# Patient Record
Sex: MALE | Race: WHITE | URBAN - METROPOLITAN AREA
[De-identification: names, ages, dates, MRNs, and addresses within clinical notes are randomized per-mention and may not be internally consistent; named-entity substitution may affect disease eponyms.]

---

## 2022-09-13 ENCOUNTER — OFFICE VISIT (OUTPATIENT)
Dept: INTERNAL MEDICINE CLINIC | Age: 22
End: 2022-09-13
Payer: COMMERCIAL

## 2022-09-13 ENCOUNTER — HOSPITAL ENCOUNTER (OUTPATIENT)
Dept: LAB | Age: 22
Discharge: HOME | End: 2022-09-13
Payer: COMMERCIAL

## 2022-09-13 VITALS
WEIGHT: 183 LBS | HEART RATE: 66 BPM | TEMPERATURE: 97 F | BODY MASS INDEX: 24.79 KG/M2 | SYSTOLIC BLOOD PRESSURE: 108 MMHG | DIASTOLIC BLOOD PRESSURE: 60 MMHG | HEIGHT: 72 IN | OXYGEN SATURATION: 99 %

## 2022-09-13 DIAGNOSIS — L02.414 ABSCESS OF ARM, LEFT: ICD-10-CM

## 2022-09-13 DIAGNOSIS — Z11.59 ENCOUNTER FOR HEPATITIS C SCREENING TEST FOR LOW RISK PATIENT: ICD-10-CM

## 2022-09-13 DIAGNOSIS — Z13.228 SCREENING FOR METABOLIC DISORDER: ICD-10-CM

## 2022-09-13 DIAGNOSIS — Z13.1 DIABETES MELLITUS SCREENING: ICD-10-CM

## 2022-09-13 DIAGNOSIS — Z76.89 ESTABLISHING CARE WITH NEW DOCTOR, ENCOUNTER FOR: Primary | ICD-10-CM

## 2022-09-13 DIAGNOSIS — Z11.4 ENCOUNTER FOR SCREENING FOR HIV: ICD-10-CM

## 2022-09-13 DIAGNOSIS — Z13.0 SCREENING FOR DEFICIENCY ANEMIA: ICD-10-CM

## 2022-09-13 DIAGNOSIS — I45.6 WPW (WOLFF-PARKINSON-WHITE SYNDROME): ICD-10-CM

## 2022-09-13 LAB
ANION GAP SERPL CALC-SCNC: 9 MMOL/L (ref 3–18)
BASOPHILS # BLD: 0.1 K/UL (ref 0–0.1)
BASOPHILS NFR BLD: 1 % (ref 0–2)
BUN SERPL-MCNC: 15 MG/DL (ref 7–18)
BUN/CREAT SERPL: 15 (ref 12–20)
CALCIUM SERPL-MCNC: 8.7 MG/DL (ref 8.5–10.1)
CHLORIDE SERPL-SCNC: 105 MMOL/L (ref 100–111)
CO2 SERPL-SCNC: 28 MMOL/L (ref 21–32)
CREAT SERPL-MCNC: 1.01 MG/DL (ref 0.6–1.3)
DIFFERENTIAL METHOD BLD: ABNORMAL
EOSINOPHIL # BLD: 0.6 K/UL (ref 0–0.4)
EOSINOPHIL NFR BLD: 5 % (ref 0–5)
ERYTHROCYTE [DISTWIDTH] IN BLOOD BY AUTOMATED COUNT: 11.8 % (ref 11.6–14.5)
EST. AVERAGE GLUCOSE BLD GHB EST-MCNC: 120 MG/DL
GLUCOSE SERPL-MCNC: 71 MG/DL (ref 74–99)
HBA1C MFR BLD: 5.8 % (ref 4.2–5.6)
HCT VFR BLD AUTO: 40.6 % (ref 36–48)
HGB BLD-MCNC: 13 G/DL (ref 13–16)
IMM GRANULOCYTES # BLD AUTO: 0.2 K/UL (ref 0–0.04)
IMM GRANULOCYTES NFR BLD AUTO: 1 % (ref 0–0.5)
LYMPHOCYTES # BLD: 3.1 K/UL (ref 0.9–3.6)
LYMPHOCYTES NFR BLD: 26 % (ref 21–52)
MCH RBC QN AUTO: 29.2 PG (ref 24–34)
MCHC RBC AUTO-ENTMCNC: 32 G/DL (ref 31–37)
MCV RBC AUTO: 91.2 FL (ref 78–100)
MONOCYTES # BLD: 0.9 K/UL (ref 0.05–1.2)
MONOCYTES NFR BLD: 8 % (ref 3–10)
NEUTS SEG # BLD: 7.1 K/UL (ref 1.8–8)
NEUTS SEG NFR BLD: 60 % (ref 40–73)
NRBC # BLD: 0 K/UL (ref 0–0.01)
NRBC BLD-RTO: 0 PER 100 WBC
PLATELET # BLD AUTO: 425 K/UL (ref 135–420)
PMV BLD AUTO: 9.2 FL (ref 9.2–11.8)
POTASSIUM SERPL-SCNC: 4.7 MMOL/L (ref 3.5–5.5)
RBC # BLD AUTO: 4.45 M/UL (ref 4.35–5.65)
SODIUM SERPL-SCNC: 142 MMOL/L (ref 136–145)
WBC # BLD AUTO: 11.8 K/UL (ref 4.6–13.2)

## 2022-09-13 PROCEDURE — 99203 OFFICE O/P NEW LOW 30 MIN: CPT | Performed by: INTERNAL MEDICINE

## 2022-09-13 PROCEDURE — 86803 HEPATITIS C AB TEST: CPT

## 2022-09-13 PROCEDURE — 85025 COMPLETE CBC W/AUTO DIFF WBC: CPT

## 2022-09-13 PROCEDURE — 83036 HEMOGLOBIN GLYCOSYLATED A1C: CPT

## 2022-09-13 PROCEDURE — 80048 BASIC METABOLIC PNL TOTAL CA: CPT

## 2022-09-13 PROCEDURE — 87389 HIV-1 AG W/HIV-1&-2 AB AG IA: CPT

## 2022-09-13 PROCEDURE — 36415 COLL VENOUS BLD VENIPUNCTURE: CPT

## 2022-09-13 RX ORDER — AMOXICILLIN AND CLAVULANATE POTASSIUM 875; 125 MG/1; MG/1
1 TABLET, FILM COATED ORAL 2 TIMES DAILY
COMMUNITY
Start: 2022-09-06 | End: 2022-09-16

## 2022-09-13 NOTE — PROGRESS NOTES
Fabio Hartley is a 25 y.o. male (: 2000) presenting to address:    Chief Complaint   Patient presents with    Establish Care       Vitals:    22 1102   Temp: 97 °F (36.1 °C)   Weight: 183 lb (83 kg)   Height: 6' (1.829 m)   PainSc:   0 - No pain       Hearing/Vision:   No results found. Learning Assessment:   No flowsheet data found. Depression Screening:     3 most recent PHQ Screens 2022   Little interest or pleasure in doing things Not at all   Feeling down, depressed, irritable, or hopeless Not at all   Total Score PHQ 2 0   Trouble falling or staying asleep, or sleeping too much Not at all   Feeling tired or having little energy Not at all   Poor appetite, weight loss, or overeating Not at all   Feeling bad about yourself - or that you are a failure or have let yourself or your family down Not at all   Trouble concentrating on things such as school, work, reading, or watching TV Not at all   Moving or speaking so slowly that other people could have noticed; or the opposite being so fidgety that others notice Not at all   Thoughts of being better off dead, or hurting yourself in some way Not at all   PHQ 9 Score 0   How difficult have these problems made it for you to do your work, take care of your home and get along with others Not difficult at all     Fall Risk Assessment:   No flowsheet data found. Abuse Screening:   No flowsheet data found. ADL Assessment:   No flowsheet data found. Coordination of Care Questionaire:   1. \"Have you been to the ER, urgent care clinic since your last visit? Hospitalized since your last visit? \" Yes When: [facility] for abcess in arm  last tuesday    2. \"Have you seen or consulted any other health care providers outside of the [address] [place] [address] since your last visit? \" No     3. For patients aged 39-70: Has the patient had a colonoscopy / FIT/ Cologuard? NA - based on age      If the patient is female:    4.  For patients aged 41-77: Has the patient had a mammogram within the past 2 years? NA - based on age or sex  See top three    5. For patients aged 21-65: Has the patient had a pap smear? NA - based on age or sex    Advanced Directive:   1. Do you have an Advanced Directive? NO    2. Would you like information on Advanced Directives?  NO

## 2022-09-13 NOTE — PROGRESS NOTES
HISTORY OF PRESENT ILLNESS  Raina Halsted is a 25 y.o. male. Patient comes to establish PCP. About 10 days ago patient noticed a wound under his left upper arm interior aspect of the biceps. He went to the emergency to get checked. Abscess was drained. He was started on doxycycline for 5 days and switched to Augmentin to complete 10 days. Still on antibiotics. Wound has improved. No secretions no redness no fever chills. He has an appointment with general surgery in 2 days. He works at the [employer], he thinks that probably he scratched himself and contaminated water. She also requires a letter to resume regular activities and work. Workplace will need to make accommodations for him to keep his wound dry and clean at all times until it completely heals. Letter will be provided. Magdalena-Parkinson-White syndrome  Following up with Michael Andujar MD (02/2022). Occasional awareness of palpitations. No syncope, no dizziness, no lightheadedness, no vision changes, no loss of consciousness. Underwent treadmill stress testing 02/2021 for 14 minutes and preexcitation was suppressed at heart rates above 180 bpm.  He is planning to have EP testing and potential ablation. Referral sent by cardiologist.  [place] up with cardiologist annually. Left arm abscess  Abscess was drained in ED (09/06/2022). Received doxycycline 5 days and switched to Augmentin to complete 10 days. Wound is healing well. General surgery follow-up on 09/15/2022. PAST MEDICAL HISTORY  Surgery: Denied. Work: At [employer] during  Family: Mother healthy. Father diabetes type 2. Siblings healthy. Social: Tobacco denied, alcohol 2 drinks a week, marijuana once a week. Sexual: Single, 1 partner, female, denied STIs history, no children. Review of Systems   Constitutional:  Negative for chills and fever.    Cardiovascular:  Negative for chest pain, palpitations and leg swelling. Gastrointestinal:  Negative for abdominal pain, constipation, diarrhea, heartburn, nausea and vomiting. Visit Vitals  /60 (BP 1 Location: Left upper arm, BP Patient Position: Sitting, BP Cuff Size: Adult)   Pulse 66   Temp 97 °F (36.1 °C)   Ht 6' (1.829 m)   Wt 183 lb (83 kg)   SpO2 99%   BMI 24.82 kg/m²     Physical Exam  HENT:      Mouth/Throat:      Mouth: Mucous membranes are moist.      Pharynx: Oropharynx is clear. Eyes:      Extraocular Movements: Extraocular movements intact. Conjunctiva/sclera: Conjunctivae normal.      Pupils: Pupils are equal, round, and reactive to light. Cardiovascular:      Rate and Rhythm: Normal rate and regular rhythm. Pulses: Normal pulses. Heart sounds: Normal heart sounds. Pulmonary:      Effort: Pulmonary effort is normal.   Abdominal:      General: Abdomen is flat. Bowel sounds are normal.      Palpations: Abdomen is soft. Musculoskeletal:        Arms:       Cervical back: Normal range of motion and neck supple. Comments: Left arm abscess with drain present no discharge, no redness, no tenderness, no induration. Wound is clean and healing. Both radial pulses are symmetric. Neurological:      Mental Status: He is alert. ASSESSMENT and PLAN    ICD-10-CM ICD-9-CM    1. Establishing care with new doctor, encounter for  Z76.89 V65.8       2. Screening for deficiency anemia  Z13.0 V78.1 CBC WITH AUTOMATED DIFF      3. Diabetes mellitus screening  K85.5 M44.3 METABOLIC PANEL, BASIC      HEMOGLOBIN A1C WITH EAG      4. Screening for metabolic disorder  K92.017 D30.84 METABOLIC PANEL, BASIC      5. Encounter for screening for HIV  Z11.4 V73.89 HIV 1/2 AG/AB, 4TH GENERATION,W RFLX CONFIRM      6. Encounter for hepatitis C screening test for low risk patient  Z11.59 V73.89 HEPATITIS C AB        Follow-up and Dispositions    Return in about 3 weeks (around 10/4/2022) for FU for HM, immunization, arm wound check. .     Patient comes to establish PCP. Letter to resume daily activities and work has been provided. He needs to keep wound clean and dry at all times. Accommodations to be done by workplace. Continue to biotics on Augmentin to complete 10 days as indicated by specialist.  Follow-up with surgery in 2 days for wound care. Screening test ordered today. Patient would like to do immunizations such as Tdap, HPV, flu vaccine and other indicated vaccines. Since he is on antibiotics he will come back in 3 weeks for immunizations. Continue annually checkups with cardiologist for Magdalena-Parkinson-White. EP referral from cardiologist office for evaluation of possible ablation. Follow-up in 3 weeks for immunizations, health maintenance. Review of wound.

## 2022-09-13 NOTE — LETTER
To whom it may concern,    Mr. Lilian Lambert was evaluated in our office on 09/13/2022. He can resume regular activities as usual but he needs to have his left arm wound clean and dry at all times until it is completely healed. Continue wound care as indicated by specialist and follow up appointment as discussed. Please make accommodations for this.      Thank you

## 2022-09-13 NOTE — LETTER
9/13/2022 11:55 AM    Mr. Malcom Bernheim  [address] [address]      Mr. Willi Townsend was evaluated in our office on 09/13/2022. He can resume regular activities as usual but he needs to have his left arm wound clean and dry at all times until it is completely healed. Continue wound care as indicated by specialist and follow-up appointment as discussed. Please make accommodations for this.         Sincerely,      Margie Cavazos MD

## 2022-09-14 LAB
HCV AB SER IA-ACNC: <0.02 INDEX
HCV AB SERPL QL IA: NEGATIVE
HCV COMMENT,HCGAC: NORMAL
HIV 1+2 AB+HIV1 P24 AG SERPL QL IA: NONREACTIVE
HIV12 RESULT COMMENT, HHIVC: NORMAL

## 2022-09-14 NOTE — PROGRESS NOTES
Patient was called, 2 steps identifier used. Patient was informed all labs normal. It was suprizing to me that his A1C was in prediabetes range. He is young healthy, fit. Active. And no hx of DM. He will keep an eye on diet and we will check in next Appt. Pt agreed.

## 2022-10-04 ENCOUNTER — OFFICE VISIT (OUTPATIENT)
Dept: INTERNAL MEDICINE CLINIC | Age: 22
End: 2022-10-04
Payer: COMMERCIAL

## 2022-10-04 VITALS
TEMPERATURE: 96.7 F | WEIGHT: 190 LBS | HEIGHT: 72 IN | RESPIRATION RATE: 15 BRPM | SYSTOLIC BLOOD PRESSURE: 113 MMHG | DIASTOLIC BLOOD PRESSURE: 63 MMHG | BODY MASS INDEX: 25.73 KG/M2 | OXYGEN SATURATION: 100 % | HEART RATE: 75 BPM

## 2022-10-04 DIAGNOSIS — L02.414 ABSCESS OF ARM, LEFT: ICD-10-CM

## 2022-10-04 DIAGNOSIS — R73.03 PRE-DIABETES: ICD-10-CM

## 2022-10-04 DIAGNOSIS — I45.6 WPW (WOLFF-PARKINSON-WHITE SYNDROME): Primary | ICD-10-CM

## 2022-10-04 PROCEDURE — 99212 OFFICE O/P EST SF 10 MIN: CPT | Performed by: INTERNAL MEDICINE

## 2022-10-04 NOTE — PROGRESS NOTES
1. \"Have you been to the ER, urgent care clinic since your last visit? Hospitalized since your last visit? \" No    2. \"Have you seen or consulted any other health care providers outside of the [address] [place] [address] since your last visit? \" Yes [facility] surgeon MAURO holder      3. For patients aged 39-70: Has the patient had a colonoscopy / FIT/ Cologuard? NA - based on age      If the patient is female:    4. For patients aged 41-77: Has the patient had a mammogram within the past 2 years? NA - based on age or sex      11. For patients aged 21-65: Has the patient had a pap smear?  NA - based on age or sex

## 2022-10-04 NOTE — PATIENT INSTRUCTIONS
Please call pediatrician to send us copy of immunization records. (Incl. . Tdap). Make virtual appt in 3 month to check on diet, physical activity and A1C, since you loss weight and A1C would have improved by then.

## 2022-10-04 NOTE — PROGRESS NOTES
HISTORY OF PRESENT ILLNESS  Peter Grant is a 25 y.o. male. Follow-up    Left arm abscess resolved  Patient coming for a follow-up on left arm abscess. He was seen by surgery couple weeks ago. Drain has been removed. Wound looks clean no redness no discharges, there is a scar healing. Overall wound is looking very good. Patient off antibiotics. He completed course. Magdalena-Parkinson-White syndrome  Referral to EP sent today. In February 2021 he was referred to EP for possible ablation and evaluation. At that time he canceled the appointment. Need for Tdap vaccination or records  Pt thinks that he had tdap vaccination about 5 years ago, does not know if records will be available from [place], would like too take Tdap today. He agreed to contact [facility] to request records or his previous. Patient. He will have the immunization records faxed to our office. Prediabetes  Patient was started regular physical activity and major diet changes. Losing weight. Keeping very active. We will recheck A1c in virtual visit in 3 months from now. Health maintenance: Up-to-date, pending Tdap vaccination record. Follow-up  Pertinent negatives include no chest pain and no shortness of breath. Blood sugar problem  Pertinent negatives include no chest pain and no shortness of breath. Review of Systems   Respiratory:  Negative for shortness of breath. Cardiovascular:  Negative for chest pain, palpitations and leg swelling. Visit Vitals  /63 (BP 1 Location: Left upper arm, BP Patient Position: Sitting, BP Cuff Size: Adult)   Pulse 75   Temp (!) 96.7 °F (35.9 °C) (Temporal)   Resp 15   Ht 6' (1.829 m)   Wt 190 lb (86.2 kg)   SpO2 100%   BMI 25.77 kg/m²     Physical Exam  Constitutional:       Appearance: Normal appearance. HENT:      Mouth/Throat:      Mouth: Mucous membranes are moist.      Pharynx: Oropharynx is clear.    Eyes:      Extraocular Movements: Extraocular movements intact. Conjunctiva/sclera: Conjunctivae normal.      Pupils: Pupils are equal, round, and reactive to light. Cardiovascular:      Rate and Rhythm: Normal rate and regular rhythm. Pulses: Normal pulses. Heart sounds: Normal heart sounds, S1 normal and S2 normal. Heart sounds not distant. No murmur heard. No systolic murmur is present. No diastolic murmur is present. No S3 or S4 sounds. Pulmonary:      Effort: Pulmonary effort is normal.      Breath sounds: Normal breath sounds. Abdominal:      General: Bowel sounds are normal.      Palpations: Abdomen is soft. Musculoskeletal:      Cervical back: Normal range of motion. Lymphadenopathy:      Cervical: No cervical adenopathy. Skin:     General: Skin is warm. Neurological:      Mental Status: He is alert and oriented to person, place, and time. Psychiatric:         Mood and Affect: Mood normal.       ASSESSMENT and PLAN    ICD-10-CM ICD-9-CM    1. WPW (Magdalena-Parkinson-White syndrome)  I45.6 426.7 REFERRAL TO CARDIAC ELECTROPHYSIOLOGY      2. Pre-diabetes  R73.03 790.29 HEMOGLOBIN A1C WITH EAG      3. Abscess of arm, left  L02.414 682.3         Follow-up and Dispositions    Return in about 1 year (around 10/4/2023) for Annual physical..     Follow-up. Referral to EP cardiologist for possible ideation and evaluation for Magdalena-Parkinson-White syndrome. Patient does not have symptoms, no palpitation, no chest pain no shortness of breath. Noted new prediabetes diagnosis in previous evaluation. We will have A1c repeated in 3 months and virtual visit to control physical activity and weight changes. Left arm abscess has resolved, there is a scar left. Overall looking very good. Patient will have [facility] or pediatrician to send vaccination records including Tdap to our office. For now on initial visit in January to check prediabetes and possible start treatment if needed. Follow-up in 1 year for annual visit.   Ideally 10/2023. Or as needed.

## 2023-02-06 ENCOUNTER — VIRTUAL VISIT (OUTPATIENT)
Dept: INTERNAL MEDICINE CLINIC | Age: 23
End: 2023-02-06
Payer: COMMERCIAL

## 2023-02-06 DIAGNOSIS — Z23 ENCOUNTER FOR IMMUNIZATION: ICD-10-CM

## 2023-02-06 DIAGNOSIS — R73.03 PRE-DIABETES: Primary | ICD-10-CM

## 2023-02-06 PROCEDURE — 99213 OFFICE O/P EST LOW 20 MIN: CPT | Performed by: INTERNAL MEDICINE

## 2023-02-06 NOTE — PROGRESS NOTES
Perri Disla (: 2000) is a 25 y.o. male, established patient, here for evaluation of the following chief complaint(s):   No chief complaint on file. Patient is following up after heart ablation. Unfortunately, it was not successful. Patient has a follow-up with cardiology in 4 weeks to discuss treatments for WPW syndrome. He has no other complaints or concerns. He denies fever chills or chest pain shortness of breath. He did make lifestyle changes that he would like to have his A1c checked again. He is also pending for Tdap, he will make a nursing appointment for this he will visit our lab in the next couple of days. Otherwise he can follow-up with me in December of this year for the annual exam or before if any concerns arises. Magdalena-Parkinson-White syndrome  FU cardiology. Prediabetes  Patient was started regular physical activity and major diet changes. Losing weight. Keeping very active. We will recheck A1c in virtual visit in 3 months from now. Hx of Left arm abscess resolved      ASSESSMENT/PLAN:  Below is the assessment and plan developed based on review of pertinent history, labs, studies, and medications. 1. Pre-diabetes  -     HEMOGLOBIN A1C WITH EAG; Future  2. Encounter for immunization  -     TDAP, [address], (AGE 10 YRS+), IM; Future    No follow-ups on file.     SUBJECTIVE/OBJECTIVE:  HPI    Review of Systems     Patient-Reported Weight: 185       Physical Exam    [INSTRUCTIONS:  \"[x]\" Indicates a positive item  \"[]\" Indicates a negative item  -- DELETE ALL ITEMS NOT EXAMINED]    Constitutional: [x] Appears well-developed and well-nourished [x] No apparent distress      [] Abnormal -     Mental status: [x] Alert and awake  [x] Oriented to person/place/time [x] Able to follow commands    [] Abnormal -     Eyes:   EOM    [x]  Normal    [] Abnormal -   Sclera  [x]  Normal    [] Abnormal -          Discharge [x]  None visible   [] Abnormal -     HENT: [x] Normocephalic, atraumatic  [] Abnormal -   [x] Mouth/Throat: Mucous membranes are moist    External Ears [x] Normal  [] Abnormal -    Neck: [x] No visualized mass [] Abnormal -     Pulmonary/Chest: [x] Respiratory effort normal   [x] No visualized signs of difficulty breathing or respiratory distress        [] Abnormal -      Musculoskeletal:   [x] Normal gait with no signs of ataxia         [x] Normal range of motion of neck        [] Abnormal -     Neurological:        [x] No Facial Asymmetry (Cranial nerve 7 motor function) (limited exam due to video visit)          [x] No gaze palsy        [] Abnormal -          Skin:        [x] No significant exanthematous lesions or discoloration noted on facial skin         [] Abnormal -            Psychiatric:       [x] Normal Affect [] Abnormal -        [x] No Hallucinations    Other pertinent observable physical exam findings:-        Chani Villarreal, was evaluated through a synchronous (real-time) audio-video encounter. The patient (or guardian if applicable) is aware that this is a billable service, which includes applicable co-pays. This Virtual Visit was conducted with patient's (and/or legal guardian's) consent. The visit was conducted pursuant to the emergency declaration under the [facility], [address] [place] [address] authority and the [employer] General Act. Patient identification was verified, and a caregiver was present when appropriate. The patient was located at: Home: [facility]  [address]  The provider was located at: Facility (Appt Department): [address]  [phone]       An electronic signature was used to authenticate this note.   -- Rosario Peres MD

## 2023-02-06 NOTE — PROGRESS NOTES
1. \"Have you been to the ER, urgent care clinic since your last visit? Hospitalized since your last visit? \" No    2. \"Have you seen or consulted any other health care providers outside of the [address] [place] [address] since your last visit? \" No Cardiologist    3. For patients aged 39-70: Has the patient had a colonoscopy / FIT/ Cologuard? No      If the patient is female:    4. For patients aged 41-77: Has the patient had a mammogram within the past 2 years? NA - based on age or sex      11. For patients aged 21-65: Has the patient had a pap smear?  NA - based on age or sex

## 2023-05-16 ENCOUNTER — TELEPHONE (OUTPATIENT)
Facility: CLINIC | Age: 23
End: 2023-05-16

## 2023-05-16 DIAGNOSIS — R79.89 LOW TESTOSTERONE: Primary | ICD-10-CM

## 2023-05-16 NOTE — TELEPHONE ENCOUNTER
Patient is calling in stating he was supposed to come in for testosterone testing. Has rescheduled his appt to 6/5 but would like to come in prior to his appointment for the labs.

## 2023-05-16 NOTE — PROGRESS NOTES
Patient requesting lab for testosterone level, advise he has to come early in the morning 730 to 8 AM to get it done. He would like to have it done before his next appointment with us.

## 2023-05-18 NOTE — TELEPHONE ENCOUNTER
S/w pt re: testosterone lab. Advised the pt that he can call the  any day between M-T early in the morning to schedule an appt to complete the lab. Pt voiced understanding. Provided the  number for scheduling. All questions answered at this time.